# Patient Record
Sex: FEMALE | Race: WHITE
[De-identification: names, ages, dates, MRNs, and addresses within clinical notes are randomized per-mention and may not be internally consistent; named-entity substitution may affect disease eponyms.]

---

## 2018-06-17 ENCOUNTER — HOSPITAL ENCOUNTER (OUTPATIENT)
Dept: HOSPITAL 17 - PHED | Age: 73
Setting detail: OBSERVATION
Discharge: HOME | End: 2018-06-17
Attending: HOSPITALIST | Admitting: HOSPITALIST
Payer: MEDICARE

## 2018-06-17 VITALS
HEART RATE: 62 BPM | RESPIRATION RATE: 20 BRPM | OXYGEN SATURATION: 95 % | SYSTOLIC BLOOD PRESSURE: 134 MMHG | DIASTOLIC BLOOD PRESSURE: 66 MMHG | TEMPERATURE: 96.4 F

## 2018-06-17 VITALS
OXYGEN SATURATION: 97 % | SYSTOLIC BLOOD PRESSURE: 150 MMHG | TEMPERATURE: 98.3 F | RESPIRATION RATE: 18 BRPM | DIASTOLIC BLOOD PRESSURE: 87 MMHG | HEART RATE: 85 BPM

## 2018-06-17 VITALS
HEART RATE: 72 BPM | TEMPERATURE: 96.2 F | DIASTOLIC BLOOD PRESSURE: 81 MMHG | RESPIRATION RATE: 20 BRPM | OXYGEN SATURATION: 95 % | SYSTOLIC BLOOD PRESSURE: 175 MMHG

## 2018-06-17 VITALS — BODY MASS INDEX: 32.93 KG/M2 | WEIGHT: 192.9 LBS | HEIGHT: 64 IN

## 2018-06-17 VITALS
DIASTOLIC BLOOD PRESSURE: 75 MMHG | HEART RATE: 73 BPM | SYSTOLIC BLOOD PRESSURE: 134 MMHG | OXYGEN SATURATION: 96 % | RESPIRATION RATE: 18 BRPM

## 2018-06-17 VITALS
HEART RATE: 68 BPM | RESPIRATION RATE: 18 BRPM | OXYGEN SATURATION: 96 % | DIASTOLIC BLOOD PRESSURE: 68 MMHG | SYSTOLIC BLOOD PRESSURE: 112 MMHG

## 2018-06-17 VITALS — OXYGEN SATURATION: 99 %

## 2018-06-17 VITALS — SYSTOLIC BLOOD PRESSURE: 120 MMHG | DIASTOLIC BLOOD PRESSURE: 72 MMHG

## 2018-06-17 VITALS — OXYGEN SATURATION: 91 %

## 2018-06-17 VITALS — OXYGEN SATURATION: 96 %

## 2018-06-17 VITALS — HEART RATE: 77 BPM

## 2018-06-17 DIAGNOSIS — I10: ICD-10-CM

## 2018-06-17 DIAGNOSIS — Z95.5: ICD-10-CM

## 2018-06-17 DIAGNOSIS — E11.9: ICD-10-CM

## 2018-06-17 DIAGNOSIS — F17.210: ICD-10-CM

## 2018-06-17 DIAGNOSIS — I25.10: ICD-10-CM

## 2018-06-17 DIAGNOSIS — R42: ICD-10-CM

## 2018-06-17 DIAGNOSIS — Z90.710: ICD-10-CM

## 2018-06-17 DIAGNOSIS — R07.9: Primary | ICD-10-CM

## 2018-06-17 LAB
BASOPHILS # BLD AUTO: 0.1 TH/MM3 (ref 0–0.2)
BASOPHILS NFR BLD: 1.6 % (ref 0–2)
BUN SERPL-MCNC: 19 MG/DL (ref 7–18)
CALCIUM SERPL-MCNC: 8.9 MG/DL (ref 8.5–10.1)
CHLORIDE SERPL-SCNC: 108 MEQ/L (ref 98–107)
CREAT SERPL-MCNC: 0.88 MG/DL (ref 0.5–1)
EOSINOPHIL # BLD: 0.1 TH/MM3 (ref 0–0.4)
EOSINOPHIL NFR BLD: 1.7 % (ref 0–4)
ERYTHROCYTE [DISTWIDTH] IN BLOOD BY AUTOMATED COUNT: 13.5 % (ref 11.6–17.2)
GFR SERPLBLD BASED ON 1.73 SQ M-ARVRAT: 63 ML/MIN (ref 89–?)
GLUCOSE SERPL-MCNC: 115 MG/DL (ref 74–106)
HCO3 BLD-SCNC: 24.3 MEQ/L (ref 21–32)
HCT VFR BLD CALC: 43.6 % (ref 35–46)
HGB BLD-MCNC: 14.7 GM/DL (ref 11.6–15.3)
INR PPP: 1 RATIO
LYMPHOCYTES # BLD AUTO: 1.9 TH/MM3 (ref 1–4.8)
LYMPHOCYTES NFR BLD AUTO: 28.3 % (ref 9–44)
MAGNESIUM SERPL-MCNC: 2.3 MG/DL (ref 1.5–2.5)
MCH RBC QN AUTO: 30.9 PG (ref 27–34)
MCHC RBC AUTO-ENTMCNC: 33.6 % (ref 32–36)
MCV RBC AUTO: 91.8 FL (ref 80–100)
MONOCYTE #: 0.5 TH/MM3 (ref 0–0.9)
MONOCYTES NFR BLD: 8.3 % (ref 0–8)
NEUTROPHILS # BLD AUTO: 4 TH/MM3 (ref 1.8–7.7)
NEUTROPHILS NFR BLD AUTO: 60.1 % (ref 16–70)
PLATELET # BLD: 257 TH/MM3 (ref 150–450)
PMV BLD AUTO: 8.9 FL (ref 7–11)
PROTHROMBIN TIME: 10.1 SEC (ref 9.8–11.6)
RBC # BLD AUTO: 4.75 MIL/MM3 (ref 4–5.3)
SODIUM SERPL-SCNC: 142 MEQ/L (ref 136–145)
TROPONIN I SERPL-MCNC: (no result) NG/ML (ref 0.02–0.05)
WBC # BLD AUTO: 6.6 TH/MM3 (ref 4–11)

## 2018-06-17 PROCEDURE — A9502 TC99M TETROFOSMIN: HCPCS

## 2018-06-17 PROCEDURE — 85610 PROTHROMBIN TIME: CPT

## 2018-06-17 PROCEDURE — 83735 ASSAY OF MAGNESIUM: CPT

## 2018-06-17 PROCEDURE — 85025 COMPLETE CBC W/AUTO DIFF WBC: CPT

## 2018-06-17 PROCEDURE — 80048 BASIC METABOLIC PNL TOTAL CA: CPT

## 2018-06-17 PROCEDURE — 82550 ASSAY OF CK (CPK): CPT

## 2018-06-17 PROCEDURE — G0378 HOSPITAL OBSERVATION PER HR: HCPCS

## 2018-06-17 PROCEDURE — 93017 CV STRESS TEST TRACING ONLY: CPT

## 2018-06-17 PROCEDURE — 84484 ASSAY OF TROPONIN QUANT: CPT

## 2018-06-17 PROCEDURE — 99285 EMERGENCY DEPT VISIT HI MDM: CPT

## 2018-06-17 PROCEDURE — 71046 X-RAY EXAM CHEST 2 VIEWS: CPT

## 2018-06-17 PROCEDURE — 93005 ELECTROCARDIOGRAM TRACING: CPT

## 2018-06-17 PROCEDURE — 85730 THROMBOPLASTIN TIME PARTIAL: CPT

## 2018-06-17 PROCEDURE — 78452 HT MUSCLE IMAGE SPECT MULT: CPT

## 2018-06-17 RX ADMIN — NITROGLYCERIN SCH INCH: 20 OINTMENT TOPICAL at 11:59

## 2018-06-17 RX ADMIN — NITROGLYCERIN SCH INCH: 20 OINTMENT TOPICAL at 17:53

## 2018-06-17 NOTE — PD
HPI


Chief Complaint:  Chest Pain


Time Seen by Provider:  09:53


Travel History


International Travel<30 days:  No


Contact w/Intl Traveler<30days:  No


Traveled to known affect area:  No





History of Present Illness


HPI


73-year-old female came to the emergency room brought by EMS for sudden onset 

chest pain about half an hour prior to arrival.  Patient has history of 

coronary artery disease and has stent.  Her last stent was year and a half ago.

  Patient had her own nitro and took the nitro 2 every 5 minutes and at that 

point her pain started to ease.  Initially the pain was 10 out of 10 and 

currently is 4 out of 10.  This is a dull pain that she said started in the 

substernal area and then radiated all over the chest and to her left shoulder.  

She started feeling dizzy but did not have a syncopal episode.  Vital signs 

were stable upon arrival.  Currently patient is awake and answering questions 

appropriately.  She is taking her Plavix every day.  Patient continues to be a 

smoker.  Patient was concerned that she was having another heart attack.





[facility]


Past Medical History


*** Narrative Medical


List of her past medical, surgical, social and family history is reviewed from 

the nursing note


Cancer:  Yes


Cardiovascular Problems:  No


Chest Pain:  Yes


Diabetes:  Yes


Endocrine:  No


Gastrointestinal Disorders:  Yes (Colitis+)


Glaucoma:  No


Genitourinary:  No


Hepatitis:  No


Hiatal Hernia:  No


Hypertension:  Yes


Immune Disorder:  No


Musculoskeletal:  Yes


Neurologic:  No (Brain Hemorrhage)


Reproductive:  Yes (Cancer)


Respiratory:  No


Integumentary:  No


Thyroid Disease:  No


Pregnant?:  Not Pregnant





Past Surgical History


Gynecologic Surgery:  Yes (Hysterectomy, ovary removal)


Thoracic Surgery:  Yes





Social History


Alcohol Use:  Yes (OCC)


Tobacco Use:  Yes (1PPD)


Substance Use:  No





Allergies-Medications


(Allergen,Severity, Reaction):  


Coded Allergies:  


     No Known Allergies (Unverified  Allergy, Unknown, 6/17/18)


Comments


No known drug allergies.


Reported Meds & Prescriptions





Reported Meds & Active Scripts


Active


Tizanidine (Tizanidine HCl) 2 Mg Tab 2 Mg PO TID


Reported


Isosorbide Mononitrate ER (Isosorbide Mononitrate) 60 Mg Tab 60 Mg PO DAILY


Metoprolol Tartrate 25 Mg Tab 25 Mg PO BID


Nitrostat SL (Nitroglycerin) 0.4 Mg Subl 0.4 Mg SL AS DIRECTED PRN


     1 tablet under the tongue as needed for chest pain. Repeat every 5


     minutes for a total of 3 DOSES or call 911 if NO relief.


Atorvastatin (Atorvastatin Calcium) 10 Mg Tab 10 Mg PO DAILY


Aspirin Low Dose (Aspirin) 81 Mg Chew 81 Mg CHEW DAILY


Zegerid (Omeprazole-Sodium Bicarbonate) 20-1,100 Mg Cap 1 Cap PO DAILY


Plavix (Clopidogrel Bisulfate) 75 Mg Tab 75 Mg PO DAILY





Narrative Medication


List of her home medications reviewed from the nursing note.





Review of Systems


Except as stated in HPI:  all other systems reviewed are Neg


Cardiovascular:  Positive: Chest Pain or Discomfort





Physical Exam


Narrative


GENERAL: Awake, alert, anxious, moderate distress


SKIN: Focused skin assessment warm/dry.


HEAD: Atraumatic. Normocephalic. 


EYES: Pupils equal and round. No scleral icterus. No injection or drainage. 


ENT: No nasal bleeding or discharge.  Mucous membranes pink and moist.


NECK: Trachea midline. No JVD. 


CARDIOVASCULAR: Regular rate and rhythm.  No murmur appreciated.


RESPIRATORY: No accessory muscle use. Clear to auscultation. Breath sounds 

equal bilaterally. 


GASTROINTESTINAL: Abdomen soft, non-tender, nondistended. Hepatic and splenic 

margins not palpable. 


MUSCULOSKELETAL: No obvious deformities. No clubbing.  No cyanosis.  No edema. 


NEUROLOGICAL: Awake and alert. No obvious cranial nerve deficits.  Motor 

grossly within normal limits. Normal speech.


PSYCHIATRIC: Appropriate mood and affect; insight and judgment normal.





Data


Data


Last Documented VS





Vital Signs








  Date Time  Temp Pulse Resp B/P (MAP) Pulse Ox O2 Delivery O2 Flow Rate FiO2


 


6/17/18 10:43  68 18 112/68 (83) 96 Nasal Cannula 2.00 


 


6/17/18 09:48 98.3       








Orders





 Orders


Electrocardiogram (6/17/18 09:59)


Basic Metabolic Panel (Bmp) (6/17/18 09:59)


Ckmb (Isoenzyme) Profile (6/17/18 09:59)


Complete Blood Count With Diff (6/17/18 09:59)


Magnesium (Mg) (6/17/18 09:59)


Prothrombin Time / Inr (Pt) (6/17/18 09:59)


Act Partial Throm Time (Ptt) (6/17/18 09:59)


Troponin I (6/17/18 09:59)


Ecg Monitoring (6/17/18 09:59)


Bilateral Bp Monitoring (6/17/18 09:59)


Iv Access Insert/Monitor (6/17/18 09:59)


Oximetry (6/17/18 09:59)


Oxygen Administration (6/17/18 09:59)


Sodium Chloride 0.9% Flush (Ns Flush) (6/17/18 10:00)


Chest, Pa & Lat (6/17/18 09:59)


Nitroglycerin 2% Oint (Nitroglycerin 2% (6/17/18 10:00)


Admit Order (Ed Use Only) (6/17/18 11:35)





Labs





Laboratory Tests








Test


  6/17/18


09:58


 


White Blood Count 6.6 TH/MM3 


 


Red Blood Count 4.75 MIL/MM3 


 


Hemoglobin 14.7 GM/DL 


 


Hematocrit 43.6 % 


 


Mean Corpuscular Volume 91.8 FL 


 


Mean Corpuscular Hemoglobin 30.9 PG 


 


Mean Corpuscular Hemoglobin


Concent 33.6 % 


 


 


Red Cell Distribution Width 13.5 % 


 


Platelet Count 257 TH/MM3 


 


Mean Platelet Volume 8.9 FL 


 


Neutrophils (%) (Auto) 60.1 % 


 


Lymphocytes (%) (Auto) 28.3 % 


 


Monocytes (%) (Auto) 8.3 % 


 


Eosinophils (%) (Auto) 1.7 % 


 


Basophils (%) (Auto) 1.6 % 


 


Neutrophils # (Auto) 4.0 TH/MM3 


 


Lymphocytes # (Auto) 1.9 TH/MM3 


 


Monocytes # (Auto) 0.5 TH/MM3 


 


Eosinophils # (Auto) 0.1 TH/MM3 


 


Basophils # (Auto) 0.1 TH/MM3 


 


CBC Comment DIFF FINAL 


 


Differential Comment  


 


Prothrombin Time 10.1 SEC 


 


Prothromb Time International


Ratio 1.0 RATIO 


 


 


Activated Partial


Thromboplast Time 28.4 SEC 


 


 


Blood Urea Nitrogen 19 MG/DL 


 


Creatinine 0.88 MG/DL 


 


Random Glucose 115 MG/DL 


 


Calcium Level 8.9 MG/DL 


 


Magnesium Level 2.3 MG/DL 


 


Sodium Level 142 MEQ/L 


 


Potassium Level 3.8 MEQ/L 


 


Chloride Level 108 MEQ/L 


 


Carbon Dioxide Level 24.3 MEQ/L 


 


Anion Gap 10 MEQ/L 


 


Estimat Glomerular Filtration


Rate 63 ML/MIN 


 


 


Total Creatine Kinase 55 U/L 


 


Troponin I


  LESS THAN 0.02


NG/ML











Exceptions


Acute Myocardial Infarction


ASA Not Given on Arrival:  Already Given by EMS





MDM


Medical Decision Making


Medical Screen Exam Complete:  Yes


Emergency Medical Condition:  Yes


Medical Record Reviewed:  Yes


Interpretation(s)


Twelve-lead EKG was reviewed by me.  Normal sinus rhythm, normal axis, 

nonspecific ST-T wave changes.  Heart rate of 85 bpm.


Differential Diagnosis


ACS, non-STEMI, nonspecific chest pain


Narrative Course


11:23 AM blood test results are back and within normal limits.  I would like to 

admit this patient for chest pain, rule out ACS given her significant past 

coronary artery disease history.  Patient was put on 2 inch Nitropaste.  

Awaiting for the hospitalist to call back





Procedures


EKG Prior to Arrival:  No





Diagnosis





 Primary Impression:  


 Chest pain


 Qualified Codes:  R07.9 - Chest pain, unspecified





Admitting Information


Admitting Physician Requests:  Observation











Haroon Ortiz MD Jun 17, 2018 09:53

## 2018-06-17 NOTE — HHI.DCPOC
Discharge Care Plan


Diagnosis:  


(1) Chest pain


Goals to Promote Your Health


* To prevent worsening of your condition and complications


* To maintain your health at the optimal level


Directions to Meet Your Goals


*** Take your medications as prescribed


*** Follow your dietary instruction


*** Follow activity as directed








*** Keep your appointments as scheduled


*** Take your immunizations and boosters as scheduled


*** If your symptoms worsen call your PCP, if no PCP go to Urgent Care Center 

or Emergency Room***


*** Smoking is Dangerous to Your Health. Avoid second hand smoke***


***Call the 24-hour hour crisis hotline for domestic abuse at 1-168.947.8713***











Janny Dumont MD Jun 17, 2018 19:03

## 2018-06-17 NOTE — RADRPT
EXAM DATE:  6/17/2018 6:51 PM EDT

AGE/SEX:        73 years / Female



INDICATIONS:Angina. . Chest pain.   

 

CLINICAL DATA:  This is the patient's initial encounter. Patient reports that signs and symptoms have
 been present for 1 day and indicates a pain score of 0/10. 

                                                                          

MEDICAL/SURGICAL HISTORY:       Cardiovascular disease.  Hypertension. Hysterectomy.  Angioplasty.



COMPARISON:      No prior exams available for comparison. 



 

DOSE:  8.5 mCi Tc 99m Myoview at rest

              27.3  mCi Tc99m-Myoview at stress

              0.4 mg Lexiscan



STRESS SYMPTOMS: Headache, dyspnea, and chest pain.







EJECTION FRACTION:  60 %



TECHNIQUE:  The patient underwent pharmacologic stress with infusion of prescribed dose.  Continuous 
ECG tracing was monitored during stress.  Gated SPECT imaging was performed after stress and conventi
onal SPECT imaging was performed at rest.  The examination was performed on a SPECT/CT scanner, both 
attenuation and non-corrected datasets were reviewed.



FINDINGS:  

Distribution:  The maximum perfused segment at stress is in the anterior wall.

Perfusion Study:   The pattern of perfusion at stress is within normal limits with regional variation
s perfusion within 25%. The pattern of perfusion at stress and rest is unchanged and there is no evid
ence of redistribution..   

Gated Study:  There are intact wall motion and wall thickening without hypokinetic or dyskinetic segm
ents.  The ejection fraction is calculated at 60%.  



RISK CATEGORY:  Low (<1% Annual Motality Rate)



CONCLUSION: 

1.  No evidence of stress-induced ischemia.

2.  Intact wall motion with 60% ejection fraction.



Electronically signed by: Teodoro Bonilla MD  6/17/2018 6:53 PM EDT

## 2018-06-17 NOTE — RADRPT
EXAM DATE:  6/17/2018 11:18 AM EDT

AGE/SEX:        73 years / Female



INDICATIONS:  Chest pain



CLINICAL DATA:  This is the patient's initial encounter. Patient reports that signs and symptoms have
 been present for 1 day and indicates a pain score of 9/10. 

                                                                          

MEDICAL/SURGICAL HISTORY:       . Heart attack  . Cardiac stent



COMPARISON:      [facility], CHEST SINGLE AP, 5/5/2016.  . 





FINDINGS:  

Frontal and lateral views of the chest demonstrate a normal-sized cardiac silhouette. No pleural effu
swapnil, airspace consolidation, or pneumothorax is identified. There is likely a mild degree of atelect
asis at the lung bases. There is biapical pleural parenchymal scar, stable from the prior study. The 
bones and soft tissues demonstrate no acute abnormality.



CONCLUSION: 

No acute cardiopulmonary abnormality is identified.



Electronically signed by: Ryan Kelly MD  6/17/2018 11:26 AM EDT

## 2018-06-17 NOTE — HHI.HP
__________________________________________________





[facility]


Service


[facility]


Primary Care Physician


Unknown


Admission Diagnosis





Chest pain, rule out ACS


Diagnoses:  


Chief Complaint:  


Chest pain


Travel History


International Travel<30 Days:  No


Contact w/Intl Traveler <30 Da:  No


Traveled to Known Affected Are:  No


History of Present Illness


This patient is a 73-year-old female with a history of coronary disease.  She 

appears quite younger than stated age and is otherwise very healthy despite her 

year tobacco history.  She comes emergency room with 1 day severe chest pain, 

crushing and radiating to the left shoulder.  Occurred at rest and relieved 

with nitroglycerin.  She describes it as "an explosion in her chest ".  There 

is no nausea or vomiting or diaphoresis.  She did not have any palpitations or 

lightheadedness.  Her cardiac enzymes and imaging have been unremarkable EKG is 

sinus rhythm.  At this point patient is admitted to the chest pain center for 

further evaluation.  I did discuss this briefly with her cardiologist who 

agreed with the current plan of action.  Patient is currently without complaint.





Review of Systems


Constitutional:  DENIES: Diaphoretic episodes, Fatigue, Fever, Weight gain, 

Weight loss, Chills, Dizziness, Change in appetite, Night Sweats


Endocrine:  DENIES: Abnorml menstrual pattern, Heat/cold intolerance, Polydipsia

, Polyuria, Polyphagia


Eyes:  DENIES: Blurred vision, Diplopia, Eye inflammation, Eye pain, Vision loss

, Photosensitivity, Double Vision


Ears, nose, mouth, throat:  DENIES: Tinnitus, Hearing loss, Vertigo, Nasal 

discharge, Oral lesions, Throat pain, Hoarseness, Ear Pain, Running Nose, 

Epistaxis, Sinus Pain, Toothache, Odynophagia


Respiratory:  DENIES: Apneas, Cough, Snoring, Wheezing, Hemoptysis, Sputum 

production, Shortness of breath


Cardiovascular:  COMPLAINS OF: Chest pain, DENIES: Palpitations, Syncope, 

Dyspnea on Exertion, PND, Lower Extremity Edema, Orthopnea, Claudication


Gastrointestinal:  DENIES: Abdominal pain, Black stools, Bloody stools, 

Constipation, Diarrhea, Nausea, Vomiting, Difficulty Swallowing, Anorexia


Genitourinary:  DENIES: Abnormal vaginal bleeding, Dysmenorrhea, Dyspareunia, 

Sexual dysfunction, Urinary frequency, Urinary incontinence, Urgency, Hematuria

, Dysuria, Nocturia, Vaginal discharge


Musculoskeletal:  DENIES: Joint pain, Muscle aches, Stiffness, Joint Swelling, 

Back pain, Neck pain


Integumentary:  DENIES: Abnormal pigmentation, Pruritus, Rash, Nail changes, 

Breast masses, Breast skin changes, Nipple discharge


Hematologic/lymphatic:  DENIES: Bruising, Lymphadenopathy


Immunologic/allergic:  DENIES: Eczema, Urticaria


Neurologic:  DENIES: Abnormal gait, Headache, Localized weakness, Paresthesias, 

Seizures, Speech Problems, Tremor, Poor Balance


Psychiatric:  DENIES: Anxiety, Confusion, Mood changes, Depression, 

Hallucinations, Agitation, Suicidal Ideation, Homicidal Ideation, Delusions


Except as stated in HPI:  all other systems reviewed are Neg





Past Family Social History


Past Medical History


Coronary artery disease with stenting


Eye surgery


Past Surgical History


Eye surgery


Hysterectomy


Cardiac cath 2018 per patient, (last catheterization in our records )


Tonsillectomy


Dental extraction


Reported Medications


Reviewed in the EMR, nothing new


Allergies:  


Coded Allergies:  


     No Known Allergies (Unverified  Allergy, Unknown, 18)


Active Ordered Medications


Reviewed in the EMR


Family History


Mother  at 93 with dementia, father  of sudden cardiac death in his 50s


Social History


No alcohol dependency


Patient smokes a half pack a day at least and has 100 pack year history





Physical Exam


Vital Signs





Vital Signs








  Date Time  Temp Pulse Resp B/P (MAP) Pulse Ox O2 Delivery O2 Flow Rate FiO2


 


18 10:43  68 18 112/68 (83) 96 Nasal Cannula 2.00 


 


18 10:06     96 Nasal Cannula 2.00 


 


18 10:05  73 18 134/75 (94) 96  2.00 


 


18 10:00     91 Room Air  


 


18 09:56  78      


 


18 09:48 98.3 85 18 150/87 (108) 97   








Physical Exam


GENERAL: This is a well-nourished, well-developed patient, in no apparent 

distress.


SKIN: No rashes, ecchymoses or lesions. Cool and dry.


HEAD: Atraumatic. Normocephalic. No temporal or scalp tenderness.


EYES: Pupils equal round and reactive. Extraocular motions intact. No scleral 

icterus. No injection or drainage. 


ENT: Nose without bleeding, purulent drainage or septal hematoma. Throat 

without erythema, tonsillar hypertrophy or exudate. Uvula midline. Airway 

patent.


NECK: Trachea midline. No JVD or lymphadenopathy. Supple, nontender, no 

meningeal signs.


CARDIOVASCULAR: Regular rate and rhythm without murmurs, gallops, or rubs. 


RESPIRATORY: Clear to auscultation. Breath sounds equal bilaterally. No wheezes

, rales, or rhonchi.  


GASTROINTESTINAL: Abdomen soft, non-tender, nondistended. No hepato-splenomegaly

, or palpable masses. No guarding.


MUSCULOSKELETAL: Extremities without clubbing, cyanosis, or edema. No joint 

tenderness, effusion, or edema noted. No calf tenderness. Negative Homans sign 

bilaterally.


NEUROLOGICAL: Awake and alert. Cranial nerves II through XII intact.  Motor and 

sensory grossly within normal limits. Five out of 5 muscle strength in all 

muscle groups.  Normal speech.


Laboratory





Laboratory Tests








Test


  18


09:58


 


White Blood Count 6.6 


 


Red Blood Count 4.75 


 


Hemoglobin 14.7 


 


Hematocrit 43.6 


 


Mean Corpuscular Volume 91.8 


 


Mean Corpuscular Hemoglobin 30.9 


 


Mean Corpuscular Hemoglobin


Concent 33.6 


 


 


Red Cell Distribution Width 13.5 


 


Platelet Count 257 


 


Mean Platelet Volume 8.9 


 


Neutrophils (%) (Auto) 60.1 


 


Lymphocytes (%) (Auto) 28.3 


 


Monocytes (%) (Auto) 8.3 


 


Eosinophils (%) (Auto) 1.7 


 


Basophils (%) (Auto) 1.6 


 


Neutrophils # (Auto) 4.0 


 


Lymphocytes # (Auto) 1.9 


 


Monocytes # (Auto) 0.5 


 


Eosinophils # (Auto) 0.1 


 


Basophils # (Auto) 0.1 


 


CBC Comment DIFF FINAL 


 


Differential Comment  


 


Prothrombin Time 10.1 


 


Prothromb Time International


Ratio 1.0 


 


 


Activated Partial


Thromboplast Time 28.4 


 


 


Blood Urea Nitrogen 19 


 


Creatinine 0.88 


 


Random Glucose 115 


 


Calcium Level 8.9 


 


Magnesium Level 2.3 


 


Sodium Level 142 


 


Potassium Level 3.8 


 


Chloride Level 108 


 


Carbon Dioxide Level 24.3 


 


Anion Gap 10 


 


Estimat Glomerular Filtration


Rate 63 


 


 


Total Creatine Kinase 55 


 


Troponin I LESS THAN 0.02 








Result Diagram:  


1858                                                                   

             18 0958








Assessment and Plan


Problem List:  


(1) Chest pain


ICD Code:  R07.9 - Chest pain, unspecified


Plan:  Worrisome to the patient's known coronary disease


Discussed with cardiology Dr. minor


Continue telemetry


Follow-up cardiac enzymes, stress test pending


Morphine, oxygen, nitro, aspirin


Discussed with ER MD and ER RN





(2) Hypertension


ICD Code:  I10 - Essential (primary) hypertension


Status:  Acute


Plan:  Currently controlled














Janny Dumont MD 2018 12:08

## 2018-06-18 NOTE — EKG
Date Performed: 06/17/2018       Time Performed: 15:30:01

 

PTAGE:      73 years

 

EKG:      Sinus rhythm 

 

 NORMAL ECG

 

PREVIOUS TRACING       : 06/17/2018 14.09

 

DOCTOR:   Deandre Nagy  Interpretating Date/Time  06/18/2018 23:00:26

## 2018-06-18 NOTE — EKG
Date Performed: 06/17/2018       Time Performed: 14:09:16

 

PTAGE:      73 years

 

EKG:      Sinus rhythm 

 

 NORMAL ECG

 

PREVIOUS TRACING       : 06/17/2018 09.49

 

DOCTOR:   Deandre Nagy  Interpretating Date/Time  06/18/2018 23:01:34

## 2018-06-18 NOTE — TR
Date Performed: 06/17/2018       Time Performed: 18:03:17

 

DOCTOR:      Loi Thomas 

 

DRUG LIST:     

CLINICAL HISTORY:     

REASON FOR TEST:     

REASON FOR ENDING:     

OBSERVATION:     

CONCLUSION:     

COMMENTS:      Lexiscan stress test was performed under standard four minute protocol.  Radionuclide 
was injected one minute prior to ending the test. No electrocardiographic abormalities were present t
o suggest ischemia. Nuclear imaging and interpretation are pending.

## 2018-06-18 NOTE — EKG
Date Performed: 06/17/2018       Time Performed: 09:49:05

 

PTAGE:      73 years

 

EKG:      Sinus rhythm 

 

 NORMAL ECG

 

PREVIOUS TRACING       : 05/25/2016 05.30

 

DOCTOR:   Deandre Nagy  Interpretating Date/Time  06/18/2018 23:06:08